# Patient Record
Sex: MALE | Race: OTHER | NOT HISPANIC OR LATINO
[De-identification: names, ages, dates, MRNs, and addresses within clinical notes are randomized per-mention and may not be internally consistent; named-entity substitution may affect disease eponyms.]

---

## 2022-08-24 PROBLEM — Z00.00 ENCOUNTER FOR PREVENTIVE HEALTH EXAMINATION: Status: ACTIVE | Noted: 2022-08-24

## 2022-08-30 ENCOUNTER — APPOINTMENT (OUTPATIENT)
Dept: SURGERY | Facility: CLINIC | Age: 47
End: 2022-08-30

## 2022-08-30 VITALS — BODY MASS INDEX: 26.99 KG/M2 | HEIGHT: 65 IN | WEIGHT: 162 LBS

## 2022-08-30 PROCEDURE — 99203 OFFICE O/P NEW LOW 30 MIN: CPT

## 2022-08-30 NOTE — PHYSICAL EXAM
[Normal Breath Sounds] : Normal breath sounds [No Rash or Lesion] : No rash or lesion [Alert] : alert [Calm] : calm [JVD] : no jugular venous distention  [de-identified] : Healthy [de-identified] : Normal [de-identified] : Mildly protuberant abdomen, mild diastases recti [de-identified] : Large umbilical hernia, small very tender supraumbilical ventral hernia

## 2022-08-30 NOTE — ASSESSMENT
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  His symptoms are exacerbated by heavy lifting which he occasionally does at work.\par \par Physical examination demonstrates a large tender strawberry sized bulge at the umbilicus and a blueberry sized tender bulge 2 fingerbreadths above the umbilicus which is exquisitely tender consistent with both an umbilical hernia and a ventral hernia warranting surgical repair.  There is no evidence of incarceration or strangulation, and the patient denies any symptoms of obstruction.  These hernias are complicated by a mild to moderate diastases recti which is likely congenital and somewhat related to his mildly protuberant abdomen.  His current BMI is 27.\par \par I explained the pros and cons of surgery, as well as all risks, benefits, indications and alternatives of the procedure and the patient understood and agreed. Kirill was scheduled for the repair of his umbilical hernia with mesh and the repair of his supraumbilical ventral hernia repair with mesh on Friday, October 14, 2022 under LOCAL with IV SEDATION at [facility] with presurgical testing waived.  He was encouraged to avoid heavy lifting and strenuous activity in the interim, of course.

## 2022-10-11 ENCOUNTER — LABORATORY RESULT (OUTPATIENT)
Age: 47
End: 2022-10-11

## 2022-10-14 ENCOUNTER — OUTPATIENT (OUTPATIENT)
Dept: OUTPATIENT SERVICES | Facility: HOSPITAL | Age: 47
LOS: 1 days | Discharge: HOME | End: 2022-10-14

## 2022-10-14 ENCOUNTER — TRANSCRIPTION ENCOUNTER (OUTPATIENT)
Age: 47
End: 2022-10-14

## 2022-10-14 ENCOUNTER — APPOINTMENT (OUTPATIENT)
Dept: SURGERY | Facility: AMBULATORY SURGERY CENTER | Age: 47
End: 2022-10-14

## 2022-10-14 VITALS
TEMPERATURE: 98 F | OXYGEN SATURATION: 99 % | HEIGHT: 65 IN | RESPIRATION RATE: 18 BRPM | DIASTOLIC BLOOD PRESSURE: 93 MMHG | SYSTOLIC BLOOD PRESSURE: 180 MMHG | HEART RATE: 71 BPM | WEIGHT: 171.08 LBS

## 2022-10-14 VITALS
RESPIRATION RATE: 20 BRPM | DIASTOLIC BLOOD PRESSURE: 96 MMHG | HEART RATE: 65 BPM | OXYGEN SATURATION: 98 % | SYSTOLIC BLOOD PRESSURE: 165 MMHG

## 2022-10-14 PROCEDURE — 49568: CPT | Mod: 59

## 2022-10-14 PROCEDURE — 49560: CPT

## 2022-10-14 PROCEDURE — 49585: CPT | Mod: XS

## 2022-10-14 RX ORDER — OXYCODONE AND ACETAMINOPHEN 5; 325 MG/1; MG/1
1 TABLET ORAL EVERY 4 HOURS
Refills: 0 | Status: DISCONTINUED | OUTPATIENT
Start: 2022-10-14 | End: 2022-10-14

## 2022-10-14 RX ORDER — TRAMADOL HYDROCHLORIDE 50 MG/1
1 TABLET ORAL
Qty: 24 | Refills: 0
Start: 2022-10-14 | End: 2022-10-17

## 2022-10-14 RX ORDER — ONDANSETRON 8 MG/1
4 TABLET, FILM COATED ORAL ONCE
Refills: 0 | Status: DISCONTINUED | OUTPATIENT
Start: 2022-10-14 | End: 2022-10-29

## 2022-10-14 RX ORDER — HYDROMORPHONE HYDROCHLORIDE 2 MG/ML
0.5 INJECTION INTRAMUSCULAR; INTRAVENOUS; SUBCUTANEOUS ONCE
Refills: 0 | Status: DISCONTINUED | OUTPATIENT
Start: 2022-10-14 | End: 2022-10-14

## 2022-10-14 RX ORDER — SODIUM CHLORIDE 9 MG/ML
1000 INJECTION, SOLUTION INTRAVENOUS
Refills: 0 | Status: DISCONTINUED | OUTPATIENT
Start: 2022-10-14 | End: 2022-10-29

## 2022-10-14 RX ORDER — HYDROMORPHONE HYDROCHLORIDE 2 MG/ML
0.5 INJECTION INTRAMUSCULAR; INTRAVENOUS; SUBCUTANEOUS
Refills: 0 | Status: DISCONTINUED | OUTPATIENT
Start: 2022-10-14 | End: 2022-10-14

## 2022-10-14 RX ORDER — METOCLOPRAMIDE HCL 10 MG
10 TABLET ORAL ONCE
Refills: 0 | Status: DISCONTINUED | OUTPATIENT
Start: 2022-10-14 | End: 2022-10-29

## 2022-10-14 RX ADMIN — SODIUM CHLORIDE 100 MILLILITER(S): 9 INJECTION, SOLUTION INTRAVENOUS at 15:06

## 2022-10-14 NOTE — BRIEF OPERATIVE NOTE - NSICDXBRIEFPOSTOP_GEN_ALL_CORE_FT
POST-OP DIAGNOSIS:  Umbilical hernia 14-Oct-2022 14:19:08  Cliff Samaniego  Ventral hernia 14-Oct-2022 14:19:09  Cliff Samaniego

## 2022-10-14 NOTE — ASU DISCHARGE PLAN (ADULT/PEDIATRIC) - CARE PROVIDER_API CALL
Cliff Samaniego)  Surgery  [address]  [place] [address]  Phone: [phone]  Fax: [phone]  Scheduled Appointment: 10/24/2022

## 2022-10-14 NOTE — BRIEF OPERATIVE NOTE - NSICDXBRIEFPREOP_GEN_ALL_CORE_FT
PRE-OP DIAGNOSIS:  Umbilical hernia 14-Oct-2022 14:19:05  Cliff Samaniego  Ventral hernia 14-Oct-2022 14:19:06  Cliff Samaniego

## 2022-10-14 NOTE — BRIEF OPERATIVE NOTE - NSICDXBRIEFPROCEDURE_GEN_ALL_CORE_FT
PROCEDURES:  Umbilical hernia repair with mesh 14-Oct-2022 14:19:13  Cliff Samaniego  Repair of ventral hernia with mesh 14-Oct-2022 14:19:14  Cliff Samaniego

## 2022-10-14 NOTE — ASU DISCHARGE PLAN (ADULT/PEDIATRIC) - NS MD DC FALL RISK RISK
For information on Fall & Injury Prevention, visit: [email or web address] OR  [email or web address] OR  https://www.cdc.gov/steadi/patient.html

## 2022-10-20 DIAGNOSIS — K43.9 VENTRAL HERNIA WITHOUT OBSTRUCTION OR GANGRENE: ICD-10-CM

## 2022-10-20 DIAGNOSIS — K42.9 UMBILICAL HERNIA WITHOUT OBSTRUCTION OR GANGRENE: ICD-10-CM

## 2022-10-24 ENCOUNTER — APPOINTMENT (OUTPATIENT)
Dept: SURGERY | Facility: CLINIC | Age: 47
End: 2022-10-24

## 2022-10-24 PROCEDURE — 99024 POSTOP FOLLOW-UP VISIT: CPT

## 2022-10-24 NOTE — CONSULT LETTER
[Dear  ___] : Dear  [unfilled], [Courtesy Letter:] : I had the pleasure of seeing your patient, [unfilled], in my office today. [Please see my note below.] : Please see my note below. [Consult Closing:] : Thank you very much for allowing me to participate in the care of this patient.  If you have any questions, please do not hesitate to contact me. [FreeTextEntry3] : \par Sincerely,\par \par Wendy Sparks PA-C, RISSA\par

## 2022-10-24 NOTE — ASSESSMENT
[FreeTextEntry1] : SWETA MAKI underwent a ventral and umbilical hernia repair with mesh with Dr. Samaniego on 10/14/22  under local IV sedation without any problems or complications. His wound is clean, dry and intact. There is no evidence of erythema, seroma formation or infection. He is tolerating a diet and having normal bowel movements. \par \par He was counseled and reassured. SWETA was discharged from the office with no specific follow up necessary, but he knows to avoid any heavy lifting or strenuous activity for the next several weeks, especially [place] \par He  was encouraged to continue to wear his abdominal binder for the better part of the next month.

## 2022-11-21 PROBLEM — Z78.9 OTHER SPECIFIED HEALTH STATUS: Chronic | Status: ACTIVE | Noted: 2022-10-14

## 2022-12-08 ENCOUNTER — APPOINTMENT (OUTPATIENT)
Dept: SURGERY | Facility: CLINIC | Age: 47
End: 2022-12-08

## 2022-12-08 DIAGNOSIS — K43.9 VENTRAL HERNIA W/OUT OBSTRUCTION OR GANGRENE: ICD-10-CM

## 2022-12-08 DIAGNOSIS — S39.011A STRAIN OF MUSCLE, FASCIA AND TENDON OF ABDOMEN, INITIAL ENCOUNTER: ICD-10-CM

## 2022-12-08 DIAGNOSIS — M62.08 SEPARATION OF MUSCLE (NONTRAUMATIC), OTHER SITE: ICD-10-CM

## 2022-12-08 DIAGNOSIS — K42.9 UMBILICAL HERNIA W/OUT OBSTRUCTION OR GANGRENE: ICD-10-CM

## 2022-12-08 PROCEDURE — 99212 OFFICE O/P EST SF 10 MIN: CPT | Mod: 24

## 2022-12-08 NOTE — CONSULT LETTER
[Dear  ___] : Dear  [unfilled], [Courtesy Letter:] : I had the pleasure of seeing your patient, [unfilled], in my office today. [Please see my note below.] : Please see my note below. [Consult Closing:] : Thank you very much for allowing me to participate in the care of this patient.  If you have any questions, please do not hesitate to contact me. [FreeTextEntry3] : Sincerely,\par \par Wendy Sparks PA-C, RISSA\par

## 2022-12-08 NOTE — ASSESSMENT
[FreeTextEntry1] : Kirill  presents back to the office 2 months after the repair of his umbilical hernia and ventral hernia with mesh  complaining of persistent  infraumbilical abdominal wall discomfort and low abdominal pain after eating.\par \par Physical examination demonstrates a well-healed scar with no evidence of hernia recurrence or delayed wound complications in the umbilical region. He does have some mild tenderness to deep palpation overlying the infraumbilical rectus muscles with no evidence of hernia or fascial defect. There is no evidence of muscular hematoma or any significant abdominal pathology. His abdomen is soft and nontender with no signs of peritonitis. His weight has remained stable and his current BMI is 26.\par \par Kirill was counseled and reassured. I believe he sustained a mild muscle strain due to overexertion and I recommended alternating ice/heat therapy and nonsteroidal anti-inflammatory medication for the next few weeks, and avoiding strenuous physical activity whenever possible during that time frame. In regards to the abdominal pain he is experiencing after eating, I recommend that he follows up with a gastroenterologist within a reasonable timeframe.  He was encouraged to return to me in the future if these symptoms persist or worsen, of course.\par

## 2022-12-20 ENCOUNTER — APPOINTMENT (OUTPATIENT)
Dept: SURGERY | Facility: CLINIC | Age: 47
End: 2022-12-20

## 2022-12-20 VITALS — WEIGHT: 170 LBS | BODY MASS INDEX: 28.32 KG/M2 | HEIGHT: 65 IN

## 2022-12-20 DIAGNOSIS — R10.30 LOWER ABDOMINAL PAIN, UNSPECIFIED: ICD-10-CM

## 2022-12-20 DIAGNOSIS — R33.9 RETENTION OF URINE, UNSPECIFIED: ICD-10-CM

## 2022-12-20 PROCEDURE — 99214 OFFICE O/P EST MOD 30 MIN: CPT | Mod: 24

## 2022-12-20 NOTE — CONSULT LETTER
[Dear  ___] : Dear  [unfilled], [Courtesy Letter:] : I had the pleasure of seeing your patient, [unfilled], in my office today. [Please see my note below.] : Please see my note below. [Consult Closing:] : Thank you very much for allowing me to participate in the care of this patient.  If you have any questions, please do not hesitate to contact me. [FreeTextEntry3] : Respectfully,\par \par Cliff Samaniego M.D., FACS\par  [DrSoledad  ___] : Dr. NIELSEN

## 2022-12-20 NOTE — ASSESSMENT
[FreeTextEntry1] : Kirill presents back to the office approximately 2 months after the repair of his ventral and umbilical hernias with mesh with concerns about lower abdominal swelling and discomfort.  He denies any pain or tenderness at his recent surgical site.  He wants to go back to the gym and start exercising again.\par \par Physical examination demonstrates a well-healed scar with no evidence of hernia recurrence or delayed wound complications.  He does have some suprapubic fullness and some tenderness to deep palpation and on further inquiry states that he often urinates 3 times a night possibly suggestive of urinary retention.  He has gained 8 pounds since surgery and his current BMI is 28.\par \par Kirill was counseled and reassured.  He has an appointment for a colonoscopy in the near future and I recommended he seek consultation with a urologist for his urinary symptoms and his lower abdominal fullness/discomfort.  He has been wearing an abdominal binder routinely and I instructed him to wear this for several months once he returns to the gym and returns to his job requiring heavy lifting.

## 2022-12-20 NOTE — PHYSICAL EXAM
[JVD] : no jugular venous distention  [Normal Breath Sounds] : Normal breath sounds [No Rash or Lesion] : No rash or lesion [Alert] : alert [Calm] : calm [de-identified] : Healthy [de-identified] : Normal [de-identified] : Mildly protuberant abdomen, mild diastases recti, fullness in lower abdomen (suprapubic region [de-identified] : No hernia recurrence

## 2024-02-09 ENCOUNTER — APPOINTMENT (OUTPATIENT)
Dept: PAIN MANAGEMENT | Facility: CLINIC | Age: 49
End: 2024-02-09

## 2024-02-25 ENCOUNTER — EMERGENCY (EMERGENCY)
Facility: HOSPITAL | Age: 49
LOS: 0 days | Discharge: HOME | End: 2024-02-25
Attending: EMERGENCY MEDICINE
Payer: COMMERCIAL

## 2024-02-25 VITALS — DIASTOLIC BLOOD PRESSURE: 94 MMHG | HEART RATE: 56 BPM | SYSTOLIC BLOOD PRESSURE: 172 MMHG

## 2024-02-25 VITALS
SYSTOLIC BLOOD PRESSURE: 214 MMHG | DIASTOLIC BLOOD PRESSURE: 104 MMHG | TEMPERATURE: 99 F | HEART RATE: 59 BPM | RESPIRATION RATE: 19 BRPM | OXYGEN SATURATION: 99 %

## 2024-02-25 DIAGNOSIS — K08.89 OTHER SPECIFIED DISORDERS OF TEETH AND SUPPORTING STRUCTURES: ICD-10-CM

## 2024-02-25 DIAGNOSIS — K02.9 DENTAL CARIES, UNSPECIFIED: ICD-10-CM

## 2024-02-25 PROCEDURE — 99283 EMERGENCY DEPT VISIT LOW MDM: CPT

## 2024-02-25 RX ORDER — IBUPROFEN 200 MG
600 TABLET ORAL ONCE
Refills: 0 | Status: COMPLETED | OUTPATIENT
Start: 2024-02-25 | End: 2024-02-25

## 2024-02-25 RX ORDER — IBUPROFEN 200 MG
1 TABLET ORAL
Qty: 21 | Refills: 0
Start: 2024-02-25 | End: 2024-03-02

## 2024-02-25 RX ORDER — AMOXICILLIN 250 MG/5ML
500 SUSPENSION, RECONSTITUTED, ORAL (ML) ORAL ONCE
Refills: 0 | Status: COMPLETED | OUTPATIENT
Start: 2024-02-25 | End: 2024-02-25

## 2024-02-25 RX ORDER — AMOXICILLIN 250 MG/5ML
1 SUSPENSION, RECONSTITUTED, ORAL (ML) ORAL
Qty: 21 | Refills: 0
Start: 2024-02-25 | End: 2024-03-02

## 2024-02-25 RX ADMIN — Medication 500 MILLIGRAM(S): at 11:50

## 2024-02-25 RX ADMIN — Medication 600 MILLIGRAM(S): at 11:50

## 2024-02-25 NOTE — ED PROVIDER NOTE - PATIENT PORTAL LINK FT
You can access the FollowMyHealth Patient Portal offered by [facility] by registering at the following website: [email or web address]. By joining [email or web address]’s FollowMyHealth portal, you will also be able to view your health information using other applications (apps) compatible with our system.

## 2024-02-25 NOTE — ED PROVIDER NOTE - NSFOLLOWUPCLINICS_GEN_ALL_ED_FT
[facility]  Dental  [address] [place] [id number]  Phone: [phone]  Fax:

## 2024-02-25 NOTE — ED PROVIDER NOTE - OBJECTIVE STATEMENT
49-year-old male presents to the ED complaining of left upper dental pain for 2 weeks worse since yesterday.  Patient denies any fever, chills or difficulty swallowing.

## 2024-02-25 NOTE — ED PROVIDER NOTE - NSDCPRINTRESULTS_ED_ALL_ED
Patient requests all Lab, Cardiology, and Radiology Results on their Discharge Instructions Hemigard Intro: Due to skin fragility and wound tension, it was decided to use HEMIGARD adhesive retention suture devices to permit a linear closure. The skin was cleaned and dried for a 6cm distance away from the wound. Excessive hair, if present, was removed to allow for adhesion.

## 2024-02-25 NOTE — ED PROVIDER NOTE - ATTENDING APP SHARED VISIT CONTRIBUTION OF CARE
49M no pmh p/w 2 weeks L upper dental pain. pt requesting tooth pulled. states tried to make appt dentist but too far out appt. no fever, swelling, discharge, cp, sob. tolerating po.     on exam, AFVSS, well sherwin nad, ncat, eomi, perrla, mmm, L upper dental ttp, no swelling, aaox3, no focal deficits, no le edema or calf ttp,     a/p; dental pain, no abscess, dc home po abx, f/u dental as outpt 1-2 weeks, strict return precautions

## 2024-03-01 ENCOUNTER — APPOINTMENT (OUTPATIENT)
Dept: PAIN MANAGEMENT | Facility: CLINIC | Age: 49
End: 2024-03-01
Payer: COMMERCIAL

## 2024-03-01 VITALS — BODY MASS INDEX: 28.66 KG/M2 | WEIGHT: 172 LBS | HEIGHT: 65 IN

## 2024-03-01 PROCEDURE — 99204 OFFICE O/P NEW MOD 45 MIN: CPT

## 2024-03-03 NOTE — DISCUSSION/SUMMARY
[de-identified] : A discussion regarding available pain management treatment options occurred with the patient. These included interventional, rehabilitative, pharmacological, and alternative modalities. We will proceed with the following:   Interventional treatment Options:  - At this time, we will order an MRI of the lumbar spine to rule out disc displacement and stenosis.   Medication treatment options:  - ordered Meloxicam 15mg daily for 4 weeks. Discussed risks and benefits. Avoid taking for any side effects. patient is aware to take for 2 weeks straight than take 1 week off before repeating. - ordered Medrol Dosepak, advised the patient of possible side effects, and to take as directed for 6 days.   Complementary treatment options: - Patient was advised to stay away from any heavy lifting. If needed, she was advised to squat and not bend forward. - Initiate physician directed activity and lifestyle modifications.  Patient will follow up once he has completed the MRI.   I, Janeen Spivey, attest that this documentation has been prepared under the direction and in the presence of Provider Sebas Shankar, DO The documentation recorded by the scribe, in my presence, accurately reflects the service I personally performed, and the decisions made by me with my edits as appropriate.   Best Regards,  Sebas Shankar D.O.

## 2024-03-03 NOTE — HISTORY OF PRESENT ILLNESS
[FreeTextEntry1] : HISTORY OF PRESENT ILLNESS: Mr. Kirill Still is a 49 year old male with a chief complaint of mid and low back pain. He notes pain is primarily on the left side of the back extending from the thoracic to lumbar spine. He notes pain is more severe when standing. He notes he has had this pain for about one year following no precipitating injury or event, however, he does work part time lifting heavy cargo, which he believes may be contributing to his pain. He has started physical therapy about 3 weeks ago and has completed about 6 sessions. He has also completed a course of NSAIDs which did not provide him with any relief. He had an MRI of the thoracic spine which was reviewed in detail during today's encounter.

## 2024-03-15 ENCOUNTER — APPOINTMENT (OUTPATIENT)
Dept: PAIN MANAGEMENT | Facility: CLINIC | Age: 49
End: 2024-03-15
Payer: COMMERCIAL

## 2024-03-15 PROCEDURE — 99213 OFFICE O/P EST LOW 20 MIN: CPT

## 2024-03-17 NOTE — HISTORY OF PRESENT ILLNESS
[FreeTextEntry1] : HISTORY OF PRESENT ILLNESS: Mr. Kirill Still is a 49 year old male with a chief complaint of mid and low back pain. He notes pain is primarily on the left side of the back extending from the thoracic to lumbar spine. He notes pain is more severe when standing. He notes he has had this pain for about one year following no precipitating injury or event, however, he does work part time lifting heavy cargo, which he believes may be contributing to his pain. He has started physical therapy about 3 weeks ago and has completed about 6 sessions. He has also completed a course of NSAIDs which did not provide him with any relief. He had an MRI of the thoracic spine which was reviewed in detail during today's encounter.   PRESENTING TODAY 03-: Patient presents to the office today for a follow up visit with continued mid/low back pain. [place] and the pain has been impeding on his ADLs and QOL. He has completed a course of oral steroids which has provided him with minimal sustained relief. He completed sessions of physical therapy with minimal to no relief.

## 2024-03-17 NOTE — DISCUSSION/SUMMARY
[de-identified] : A discussion regarding available pain management treatment options occurred with the patient. These included interventional, rehabilitative, pharmacological, and alternative modalities. We will proceed with the following:   Interventional treatment Options:  - At this time, we will order an MRI of the lumbar spine to rule out disc displacement and stenosis.   Medication treatment options:  - re-ordered Meloxicam 15mg daily for 4 weeks. Discussed risks and benefits. Avoid taking for any side effects. patient is aware to take for 2 weeks straight than take 1 week off before repeating.  Complementary treatment options: - Patient was advised to stay away from any heavy lifting. If needed, he was advised to squat and not bend forward. - Initiate physician directed activity and lifestyle modifications.  Patient will follow up once he has completed the MRI.   I, Gopi Díaz, attest that this documentation has been prepared under the direction and in the presence of Provider Sebas Shankar, DO The documentation recorded by the scribe, in my presence, accurately reflects the service I personally performed, and the decisions made by me with my edits as appropriate.   Best Regards, Sebas Shankar D.O.

## 2024-04-12 ENCOUNTER — APPOINTMENT (OUTPATIENT)
Dept: PAIN MANAGEMENT | Facility: CLINIC | Age: 49
End: 2024-04-12
Payer: COMMERCIAL

## 2024-04-12 VITALS — BODY MASS INDEX: 28.66 KG/M2 | WEIGHT: 172 LBS | HEIGHT: 65 IN

## 2024-04-12 DIAGNOSIS — M47.816 SPONDYLOSIS W/OUT MYELOPATHY OR RADICULOPATHY, LUMBAR REGION: ICD-10-CM

## 2024-04-12 PROCEDURE — 99214 OFFICE O/P EST MOD 30 MIN: CPT

## 2024-04-12 RX ORDER — METHYLPREDNISOLONE 4 MG/1
4 TABLET ORAL
Qty: 1 | Refills: 0 | Status: ACTIVE | COMMUNITY
Start: 2024-03-01 | End: 1900-01-01

## 2024-04-12 NOTE — DISCUSSION/SUMMARY
[de-identified] : A discussion regarding available pain management treatment options occurred with the patient. These included interventional, rehabilitative, pharmacological, and alternative modalities. We will proceed with the following:   Interventional treatment Options:  - He is scheduled for an MRI of the lumbar spine on 04/17/24.   Medication treatment options:  - ordered a Medrol Dose Pack 4mg, use as directed for a duration of 6 days. - re-ordered Meloxicam 15mg daily for 4 weeks. Discussed risks and benefits. Avoid taking for any side effects. patient is aware to take for 2 weeks straight than take 1 week off before repeating.  Complementary treatment options: - Patient was advised to stay away from any heavy lifting. If needed, he was advised to squat and not bend forward. - physician directed activity and lifestyle modifications.  Patient will follow up once he has completed the MRI.   I, Gopi Díaz, attest that this documentation has been prepared under the direction and in the presence of Provider Sebas Shankar, DO The documentation recorded by the scribe, in my presence, accurately reflects the service I personally performed, and the decisions made by me with my edits as appropriate.   Best Regards, Sebas Shankar D.O.

## 2024-04-12 NOTE — HISTORY OF PRESENT ILLNESS
[FreeTextEntry1] : HISTORY OF PRESENT ILLNESS: Mr. Kirill Still is a 49-year-old male with a chief complaint of mid and low back pain. He notes pain is primarily on the left side of the back extending from the thoracic to lumbar spine. He notes pain is more severe when standing. He notes he has had this pain for about one year following no precipitating injury or event, however, he does work part time lifting heavy cargo, which he believes may be contributing to his pain. He has started physical therapy about 3 weeks ago and has completed about 6 sessions. He has also completed a course of NSAIDs which did not provide him with any relief. He had an MRI of the thoracic spine which was reviewed in detail during today's encounter.   PRESENTING TODAY 04-: Patient presents to the office today for a follow up visit with continued mid/low back pain. [place] is a  [place] and the pain has been impeding on his ADLs and QOL.  He completed sessions of physical therapy with minimal to no relief. He has been taking Meloxicam 15 mg daily which provides him with 30-40% improvement in pain and increase in function. He denies any side effects.

## 2024-04-18 ENCOUNTER — APPOINTMENT (OUTPATIENT)
Dept: MRI IMAGING | Facility: CLINIC | Age: 49
End: 2024-04-18
Payer: COMMERCIAL

## 2024-04-18 PROCEDURE — 72148 MRI LUMBAR SPINE W/O DYE: CPT

## 2024-05-17 ENCOUNTER — APPOINTMENT (OUTPATIENT)
Dept: PAIN MANAGEMENT | Facility: CLINIC | Age: 49
End: 2024-05-17

## 2024-05-23 ENCOUNTER — APPOINTMENT (OUTPATIENT)
Dept: PAIN MANAGEMENT | Facility: CLINIC | Age: 49
End: 2024-05-23
Payer: COMMERCIAL

## 2024-05-23 VITALS — HEIGHT: 65 IN | BODY MASS INDEX: 28.66 KG/M2 | WEIGHT: 172 LBS

## 2024-05-23 DIAGNOSIS — M54.16 RADICULOPATHY, LUMBAR REGION: ICD-10-CM

## 2024-05-23 PROCEDURE — 99213 OFFICE O/P EST LOW 20 MIN: CPT

## 2024-05-23 RX ORDER — MELOXICAM 15 MG/1
15 TABLET ORAL DAILY
Qty: 30 | Refills: 0 | Status: ACTIVE | COMMUNITY
Start: 2024-03-15 | End: 1900-01-01

## 2024-05-23 NOTE — DISCUSSION/SUMMARY
[de-identified] : A discussion regarding available pain management treatment options occurred with the patient. These included interventional, rehabilitative, pharmacological, and alternative modalities. We will proceed with the following:   Interventional treatment Options:  - Potential treatment options such as further conservative therapy, injections, and surgery were briefly discussed.  Medication treatment options:  - renewed Meloxicam 15mg daily for 4 weeks. Discussed risks and benefits. Avoid taking for any side effects. patient is aware to take for 2 weeks straight than take 1 week off before repeating.  Rehabilitative treatment options:  - Initiate Physical therapy of the lumbar 2-3 times a week for 4-8 weeks stressing a home exercise program of walking, shoulder griddle strengthening, swimming, elliptical , recumbent bike, Brooks chi and Yoga. Use things that heat like hot shower or icy heat before rehab, exercising and at the beginning of the day, and ice (ice in a bag never directly on the skin) after activity and at the end of the day.   Complementary treatment options: - Patient was advised to stay away from any heavy lifting. If needed, he was advised to squat and not bend forward. - physician directed activity and lifestyle modifications.  Follow up in 6-8 weeks for reassessment.  I, Gopi Díaz, attest that this documentation has been prepared under the direction and in the presence of Provider Sebas Shankar, DO The documentation recorded by the scribe, in my presence, accurately reflects the service I personally performed, and the decisions made by me with my edits as appropriate.   Best Regards, Sebas Shankar D.O.

## 2024-05-23 NOTE — HISTORY OF PRESENT ILLNESS
[FreeTextEntry1] : HISTORY OF PRESENT ILLNESS: Mr. Kirill Still is a 49-year-old male with a chief complaint of mid and low back pain. He notes pain is primarily on the left side of the back extending from the thoracic to lumbar spine. He notes pain is more severe when standing. He notes he has had this pain for about one year following no precipitating injury or event, however, he does work part time lifting heavy cargo, which he believes may be contributing to his pain. He has started physical therapy about 3 weeks ago and has completed about 6 sessions. He has also completed a course of NSAIDs which did not provide him with any relief. He had an MRI of the thoracic spine which was reviewed in detail during today's encounter.   PRESENTING TODAY 05-: Patient presents to the office today for a follow up visit with continued mid/low back pain. He is a  at an [place] and the pain has been impeding on his ADLs and QOL.  He completed sessions of physical therapy with minimal to no relief. He has been taking Meloxicam 15 mg daily which provides him with 30-40% improvement in pain and increase in function. He denies any side effects. We reviewed the MRI of his lumbar spine in detail during today's visit. He notes he has ran out of the meloxicam these last few days and the pain has been excruciating. We discussed alternative treatment options such as injection therapy and he would like to continue with conservative therapy in the interim. He rates the pain a 7 out of 10 on the pain scale today. Patient denies any bowel or bladder dysfunction, incontinence, or saddle anesthesia.

## 2024-08-16 ENCOUNTER — APPOINTMENT (OUTPATIENT)
Dept: PAIN MANAGEMENT | Facility: CLINIC | Age: 49
End: 2024-08-16

## 2024-08-22 ENCOUNTER — APPOINTMENT (OUTPATIENT)
Dept: PAIN MANAGEMENT | Facility: CLINIC | Age: 49
End: 2024-08-22

## 2024-08-22 DIAGNOSIS — M54.16 RADICULOPATHY, LUMBAR REGION: ICD-10-CM

## 2024-08-22 DIAGNOSIS — M47.816 SPONDYLOSIS W/OUT MYELOPATHY OR RADICULOPATHY, LUMBAR REGION: ICD-10-CM

## 2024-08-22 PROCEDURE — 99213 OFFICE O/P EST LOW 20 MIN: CPT

## 2024-08-22 NOTE — DISCUSSION/SUMMARY
[de-identified] : A discussion regarding available pain management treatment options occurred with the patient. These included interventional, rehabilitative, pharmacological, and alternative modalities. We will proceed with the following:   Interventional treatment Options:  - Potential treatment options such as further conservative therapy & injections were briefly discussed.  Medication treatment options:  - renewed Meloxicam 15mg daily for 4 weeks. Discussed risks and benefits. Avoid taking for any side effects. patient is aware to take for 2 weeks straight than take 1 week off before repeating.  Rehabilitative treatment options:  - Physical therapy of the lumbar 2-3 times a week for 4-8 weeks stressing a home exercise program of walking, shoulder griddle strengthening, swimming, elliptical , recumbent bike, Brooks chi and Yoga. Use things that heat like hot shower or icy heat before rehab, exercising and at the beginning of the day, and ice (ice in a bag never directly on the skin) after activity and at the end of the day.   Complementary treatment options: - Patient was advised to stay away from any heavy lifting. If needed, he was advised to squat and not bend forward. - Physician directed activity and lifestyle modifications.  Follow up in 6-8 weeks for reassessment.  I, Gopi Díaz, attest that this documentation has been prepared under the direction and in the presence of Provider Sebas Shankar, DO The documentation recorded by the scribe, in my presence, accurately reflects the service I personally performed, and the decisions made by me with my edits as appropriate.   Best Regards, Sebas Shankar D.O.

## 2024-08-22 NOTE — HISTORY OF PRESENT ILLNESS
[FreeTextEntry1] : HISTORY OF PRESENT ILLNESS: Mr. Kirill Still is a 49-year-old male with a chief complaint of mid and low back pain. He notes pain is primarily on the left side of the back extending from the thoracic to lumbar spine. He notes pain is more severe when standing. He notes he has had this pain for about one year following no precipitating injury or event, however, he does work part time lifting heavy cargo, which he believes may be contributing to his pain. He has started physical therapy about 3 weeks ago and has completed about 6 sessions. He has also completed a course of NSAIDs which did not provide him with any relief. He had an MRI of the thoracic spine which was reviewed in detail during today's encounter.   PRESENTING TODAY 08-: Patient presents to the office today for a follow up visit with continued mid/low back pain. He is a  at an airport and the pain has been impeding on his ADLs and QOL.  He completed sessions of physical therapy with minimal to no relief. He has been taking Meloxicam 15 mg daily which provides him with 30-40% improvement in pain and increase in function. He denies any side effects. He notes he has run out of the meloxicam. We discussed alternative treatment options such as injection therapy and he would like to continue with conservative therapy in the interim. He rates the pain a 5 out of 10 on the pain scale today. Patient denies any bowel or bladder dysfunction, incontinence, or saddle anesthesia.

## 2024-09-27 ENCOUNTER — APPOINTMENT (OUTPATIENT)
Dept: PAIN MANAGEMENT | Facility: CLINIC | Age: 49
End: 2024-09-27
Payer: COMMERCIAL

## 2024-09-27 DIAGNOSIS — M47.816 SPONDYLOSIS W/OUT MYELOPATHY OR RADICULOPATHY, LUMBAR REGION: ICD-10-CM

## 2024-09-27 PROCEDURE — 99213 OFFICE O/P EST LOW 20 MIN: CPT

## 2024-09-27 NOTE — HISTORY OF PRESENT ILLNESS
[FreeTextEntry1] : HISTORY OF PRESENT ILLNESS: Mr. Kirill Still is a 49-year-old male with a chief complaint of mid and low back pain. He notes pain is primarily on the left side of the back extending from the thoracic to lumbar spine. He notes pain is more severe when standing. He notes he has had this pain for about one year following no precipitating injury or event, however, he does work part time lifting heavy cargo, which he believes may be contributing to his pain. He has started physical therapy about 3 weeks ago and has completed about 6 sessions. He has also completed a course of NSAIDs which did not provide him with any relief. He had an MRI of the thoracic spine which was reviewed in detail during today's encounter.   PRESENTING TODAY 09-: Patient presents to the office today for a follow up visit with continued mid/low back pain.  He recently sustained a fall 1 week ago in his bathroom due to loss of balance where he exacerbated his back pain and reported to urgent care where they obtained an x-ray which was normal, we will obtain an MRI of his lumbar spine. [place] and the pain has been impeding on his ADLs and QOL.  He completed sessions of physical therapy with minimal to no relief. He has been taking Meloxicam 15 mg daily which provides him with 30-40% improvement in pain and increase in function. He denies any side effects. He notes he has run out of the meloxicam. He rates the pain an 8 out of 10 on the pain scale today. Patient denies any bowel or bladder dysfunction, incontinence, or saddle anesthesia.

## 2024-09-27 NOTE — DISCUSSION/SUMMARY
[de-identified] : A discussion regarding available pain management treatment options occurred with the patient. These included interventional, rehabilitative, pharmacological, and alternative modalities. We will proceed with the following:   Interventional treatment Options:  1. MRI lumbar spine w/o contrast was ordered to evaluate for anatomic changes of the lumbar discs, nerves, and surrounding tissue that will help provide information to accurately diagnose the patient's cause of pain and therefore treat said pain generator in the most effective way possible - whether that be specific physical therapy recommendations, medications, and/or interventional therapies.  Medication treatment options:  - c/w Meloxicam 15mg daily for 4 weeks. Discussed risks and benefits. Avoid taking for any side effects. patient is aware to take for 2 weeks straight than take 1 week off before repeating.  Rehabilitative treatment options:  - c/w Physical therapy of the lumbar 2-3 times a week for 4-8 weeks stressing a home exercise program of walking, shoulder griddle strengthening, swimming, elliptical , recumbent bike, Brooks chi and Yoga. Use things that heat like hot shower or icy heat before rehab, exercising and at the beginning of the day, and ice (ice in a bag never directly on the skin) after activity and at the end of the day.   Complementary treatment options: - Patient was advised to stay away from any heavy lifting. If needed, he was advised to squat and not bend forward. - Physician directed activity and lifestyle modifications.   Follow up after imaging studies for further recommendations.  I, Gopi Díaz, attest that this documentation has been prepared under the direction and in the presence of Provider Sebas Shankar, DO The documentation recorded by the scribe, in my presence, accurately reflects the service I personally performed, and the decisions made by me with my edits as appropriate.   Best Regards, Sebas Shankar D.O.

## 2024-09-27 NOTE — DISCUSSION/SUMMARY
[de-identified] : A discussion regarding available pain management treatment options occurred with the patient. These included interventional, rehabilitative, pharmacological, and alternative modalities. We will proceed with the following:   Interventional treatment Options:  1. MRI lumbar spine w/o contrast was ordered to evaluate for anatomic changes of the lumbar discs, nerves, and surrounding tissue that will help provide information to accurately diagnose the patient's cause of pain and therefore treat said pain generator in the most effective way possible - whether that be specific physical therapy recommendations, medications, and/or interventional therapies.  Medication treatment options:  - c/w Meloxicam 15mg daily for 4 weeks. Discussed risks and benefits. Avoid taking for any side effects. patient is aware to take for 2 weeks straight than take 1 week off before repeating.  Rehabilitative treatment options:  - c/w Physical therapy of the lumbar 2-3 times a week for 4-8 weeks stressing a home exercise program of walking, shoulder griddle strengthening, swimming, elliptical , recumbent bike, Brooks chi and Yoga. Use things that heat like hot shower or icy heat before rehab, exercising and at the beginning of the day, and ice (ice in a bag never directly on the skin) after activity and at the end of the day.   Complementary treatment options: - Patient was advised to stay away from any heavy lifting. If needed, he was advised to squat and not bend forward. - Physician directed activity and lifestyle modifications.   Follow up after imaging studies for further recommendations.  I, Gopi Díaz, attest that this documentation has been prepared under the direction and in the presence of Provider Sebas Shankar, DO The documentation recorded by the scribe, in my presence, accurately reflects the service I personally performed, and the decisions made by me with my edits as appropriate.   Best Regards, Sebas Shankar D.O.

## 2024-11-01 ENCOUNTER — APPOINTMENT (OUTPATIENT)
Dept: MRI IMAGING | Facility: CLINIC | Age: 49
End: 2024-11-01
Payer: COMMERCIAL

## 2024-11-01 PROCEDURE — 72148 MRI LUMBAR SPINE W/O DYE: CPT

## 2024-11-15 ENCOUNTER — APPOINTMENT (OUTPATIENT)
Dept: PAIN MANAGEMENT | Facility: CLINIC | Age: 49
End: 2024-11-15
Payer: COMMERCIAL

## 2024-11-15 DIAGNOSIS — M47.816 SPONDYLOSIS W/OUT MYELOPATHY OR RADICULOPATHY, LUMBAR REGION: ICD-10-CM

## 2024-11-15 DIAGNOSIS — M54.16 RADICULOPATHY, LUMBAR REGION: ICD-10-CM

## 2024-11-15 PROCEDURE — 99213 OFFICE O/P EST LOW 20 MIN: CPT

## 2024-12-20 ENCOUNTER — APPOINTMENT (OUTPATIENT)
Dept: PAIN MANAGEMENT | Facility: CLINIC | Age: 49
End: 2024-12-20

## 2024-12-26 ENCOUNTER — APPOINTMENT (OUTPATIENT)
Dept: PAIN MANAGEMENT | Facility: CLINIC | Age: 49
End: 2024-12-26
Payer: COMMERCIAL

## 2024-12-26 DIAGNOSIS — M47.816 SPONDYLOSIS W/OUT MYELOPATHY OR RADICULOPATHY, LUMBAR REGION: ICD-10-CM

## 2024-12-26 DIAGNOSIS — M54.16 RADICULOPATHY, LUMBAR REGION: ICD-10-CM

## 2024-12-26 PROCEDURE — 99213 OFFICE O/P EST LOW 20 MIN: CPT

## 2025-02-06 ENCOUNTER — APPOINTMENT (OUTPATIENT)
Dept: PAIN MANAGEMENT | Facility: CLINIC | Age: 50
End: 2025-02-06
Payer: COMMERCIAL

## 2025-02-06 DIAGNOSIS — M47.816 SPONDYLOSIS W/OUT MYELOPATHY OR RADICULOPATHY, LUMBAR REGION: ICD-10-CM

## 2025-02-06 DIAGNOSIS — M54.50 LOW BACK PAIN, UNSPECIFIED: ICD-10-CM

## 2025-02-06 PROCEDURE — 99213 OFFICE O/P EST LOW 20 MIN: CPT

## 2025-03-14 ENCOUNTER — APPOINTMENT (OUTPATIENT)
Dept: PAIN MANAGEMENT | Facility: CLINIC | Age: 50
End: 2025-03-14

## 2025-04-18 ENCOUNTER — APPOINTMENT (OUTPATIENT)
Dept: PAIN MANAGEMENT | Facility: CLINIC | Age: 50
End: 2025-04-18
Payer: COMMERCIAL

## 2025-04-18 DIAGNOSIS — M47.816 SPONDYLOSIS W/OUT MYELOPATHY OR RADICULOPATHY, LUMBAR REGION: ICD-10-CM

## 2025-04-18 DIAGNOSIS — M54.16 RADICULOPATHY, LUMBAR REGION: ICD-10-CM

## 2025-04-18 PROCEDURE — 99213 OFFICE O/P EST LOW 20 MIN: CPT

## 2025-06-03 ENCOUNTER — APPOINTMENT (OUTPATIENT)
Dept: RADIOLOGY | Facility: CLINIC | Age: 50
End: 2025-06-03

## 2025-06-03 ENCOUNTER — APPOINTMENT (OUTPATIENT)
Dept: PAIN MANAGEMENT | Facility: CLINIC | Age: 50
End: 2025-06-03

## 2025-06-03 DIAGNOSIS — M54.50 LOW BACK PAIN, UNSPECIFIED: ICD-10-CM

## 2025-06-03 DIAGNOSIS — M25.512 PAIN IN LEFT SHOULDER: ICD-10-CM

## 2025-06-03 PROCEDURE — ZZZZZ: CPT

## 2025-06-20 ENCOUNTER — APPOINTMENT (OUTPATIENT)
Dept: PAIN MANAGEMENT | Facility: CLINIC | Age: 50
End: 2025-06-20

## 2025-07-03 ENCOUNTER — APPOINTMENT (OUTPATIENT)
Dept: PAIN MANAGEMENT | Facility: CLINIC | Age: 50
End: 2025-07-03

## 2025-07-03 PROCEDURE — 99214 OFFICE O/P EST MOD 30 MIN: CPT

## 2025-07-05 PROBLEM — M75.52 SUBDELTOID BURSITIS OF LEFT SHOULDER JOINT: Status: ACTIVE | Noted: 2025-07-05

## 2025-08-15 ENCOUNTER — APPOINTMENT (OUTPATIENT)
Dept: PAIN MANAGEMENT | Facility: CLINIC | Age: 50
End: 2025-08-15

## 2025-08-19 ENCOUNTER — APPOINTMENT (OUTPATIENT)
Dept: ORTHOPEDIC SURGERY | Facility: CLINIC | Age: 50
End: 2025-08-19

## 2025-08-19 DIAGNOSIS — M25.512 PAIN IN LEFT SHOULDER: ICD-10-CM

## 2025-08-19 PROCEDURE — 99203 OFFICE O/P NEW LOW 30 MIN: CPT

## 2025-09-05 ENCOUNTER — APPOINTMENT (OUTPATIENT)
Dept: PAIN MANAGEMENT | Facility: CLINIC | Age: 50
End: 2025-09-05
Payer: COMMERCIAL

## 2025-09-05 DIAGNOSIS — M54.16 RADICULOPATHY, LUMBAR REGION: ICD-10-CM

## 2025-09-05 DIAGNOSIS — M47.816 SPONDYLOSIS W/OUT MYELOPATHY OR RADICULOPATHY, LUMBAR REGION: ICD-10-CM

## 2025-09-05 PROCEDURE — 99214 OFFICE O/P EST MOD 30 MIN: CPT
